# Patient Record
(demographics unavailable — no encounter records)

---

## 2024-11-15 NOTE — REVIEW OF SYSTEMS
[Fatigue] : fatigue [Vision Problems] : vision problems [Lower Ext Edema] : lower extremity edema [Shortness Of Breath] : shortness of breath [Cough] : cough [Dyspnea on Exertion] : dyspnea on exertion [Unsteady Walking] : ataxia [Negative] : Heme/Lymph [Fever] : no fever [Chills] : no chills [Hearing Loss] : no hearing loss [Chest Pain] : no chest pain [Palpitations] : no palpitations [Wheezing] : no wheezing [Abdominal Pain] : no abdominal pain [Nausea] : no nausea [Constipation] : no constipation [Dysuria] : no dysuria [Incontinence] : no incontinence [Joint Pain] : no joint pain [Joint Stiffness] : no joint stiffness [Muscle Pain] : no muscle pain [Back Pain] : no back pain [Skin Rash] : no skin rash [Headache] : no headache [Dizziness] : no dizziness [Insomnia] : no insomnia

## 2024-11-15 NOTE — CURRENT MEDS
[Takes medication as prescribed] : takes [None] : Patient does not have any barriers to medication adherence [Yes] : Reviewed medication list for presence of high-risk medications. [Blood Thinners] : blood thinners [FreeTextEntry1] : Apixaban - denies bleeding

## 2024-11-15 NOTE — COUNSELING
[Fall prevention counseling provided] : Fall prevention counseling provided [None] : None [Good understanding] : Patient has a good understanding of lifestyle changes and steps needed to achieve self management goal [Use recommended devices] : Use recommended devices

## 2024-11-15 NOTE — HISTORY OF PRESENT ILLNESS
[Home] : at home, [unfilled] , at the time of the visit. [Other Location: e.g. Home (Enter Location, City,State)___] : at [unfilled] [Verbal consent obtained from patient] : the patient, [unfilled] [Other: _____] : [unfilled] [de-identified] : This patient is Enrolled in the Post-Discharge [facility] Follow Up Program through [facility] for Continuity of Care S/P Recent Hospitalization until seen by PCP.  Brief Hospital Course copied: 67-year-old female former smoker, w pmh htn, hld, hfpef, afib, copd c/b chronic respiratory failure req 3 lpm via nc, bipolar disorder, anx/dep, osteoarthritis/osteoporosis, p/w sob/david; symptoms started yesterday, a/w worsening cough, denies recent exposure to sick contacts, has had recent travel a couple of weeks ago to [place] via car. symptoms have quickly progressed today, prompting patient to present to [facility] er for further evaluation.  Hospital Course: COPD Exacerbation (PCO2 64 unclear baseline s/p azithromycin x1, solumedrol 125 + Duoneb in ED, c/w Xopenex and Trelegy, s/p IV solumedrol 40mg Q12 hr and PO Azithro; on PO Prednisone. Patient to follow-up with PCP and pulmonologist. Atrial fibrillation with RVR, continue taking Eliquis. HFpEF, TTE with elevated PA pressures, c/w home Lasix.   Televisit with pt. RN Min also present. Pt is awake and alert, oriented x 3. Wearing nasal cannula with O2 @ 2l/m. Pt is able to speak in full sentences, no respiratory distress, not using accessory muscles. RN reporting no wheezing. Pt reports only intermittent cough, sleeping well at nights, no need for CPAP for DOYLE. Reports she needs surgery for ankle fracture, needs medical clearance once she improves. She has lost 4.6 lbs since being home. Emotional support provided to continue on her weight loss journey. She is ambulating with walker, makes contact with wall or furniture and sometimes a wheelchair.  Pt reports she does not use salt but has been taking a sodium tablet, informed her that is not in the discharge plan, to maintain the 2000mg sodium recommended on discharge and /u with her PCP/CARDS if they want to resume that medication. No fluid restrictions. Pt advised to also f/u for Daliresp and Potassium as those medications are not in her discharge plan. She has f/u appt with her CARDS on Monday 11/18. Pt will f/u with her psychiatrist for her mental health disorders. Confirm she is taking Xanax PRN, Depekane, Seroquel and Wellbutrin.   TCM NP reviewed that pt is under the [facility] program for home care until pt is seen by PCP. TCM NP will be assigned to sign Home Care orders post-discharge [FreeTextEntry1] : F/u post hospitalization at [facility] from 11/7-10 for SOB/VICTOR

## 2024-11-15 NOTE — PHYSICAL EXAM
[No Acute Distress] : no acute distress [Normal Sclera/Conjunctiva] : normal sclera/conjunctiva [Normal Outer Ear/Nose] : the outer ears and nose were normal in appearance [No JVD] : no jugular venous distention [No Respiratory Distress] : no respiratory distress  [No Accessory Muscle Use] : no accessory muscle use [No Edema] : there was no peripheral edema [Soft] : abdomen soft [Normal] : no rash [Speech Grossly Normal] : speech grossly normal [Memory Grossly Normal] : memory grossly normal [Normal Affect] : the affect was normal [Alert and Oriented x3] : oriented to person, place, and time [Normal Mood] : the mood was normal [Normal Insight/Judgement] : insight and judgment were intact [de-identified] : pt awake and alert x3, wearing nasal cannula [de-identified] : glasses [de-identified] : uses walker or make contact with wall and furniture

## 2024-11-15 NOTE — ASSESSMENT
[FreeTextEntry1] : COPD Exacerbation (PCO2 64 unclear baseline s/p azithromycin x1, solumedrol 125 + Duoneb in ED, c/w Xopenex and Trelegy, s/p IV solumedrol 40mg Q12 hr and PO Azithro; on PO Prednisone. Patient to follow-up with PCP and pulmonologist. Atrial fibrillation with RVR, continue taking Eliquis. HFpEF, TTE with elevated PA pressures, c/w home Lasix.

## 2024-11-15 NOTE — PLAN
[FreeTextEntry1] : 1. continue all medications as prescribed 2. f/u with CARDS/PCP on Monday /PULM 3. maintain 2,000mg Na diet 4. maintain fall precaution 5. cont O2 use as prescribed

## 2025-03-03 NOTE — PHYSICAL EXAM
[Respiratory Effort] : normal respiratory effort [Normal Rate and Rhythm] : normal rate and rhythm [Abdomen Tenderness] : ~T ~M No abdominal tenderness [No Rash or Lesion] : No rash or lesion [Alert] : alert [Oriented to Person] : oriented to person [Oriented to Place] : oriented to place [Oriented to Time] : oriented to time [Calm] : calm [de-identified] : no acute distress noted, [de-identified] : normocephalic [FreeTextEntry1] : palpable pedal pulses, varicose veins bilateral posterior calves

## 2025-03-03 NOTE — HISTORY OF PRESENT ILLNESS
[FreeTextEntry1] : 68-year-old female presents for new patient evaluation from her primary care provider regarding bilateral lower extremity varicosities on posterior calf.  She denies any leg swelling states that varicosities are not painful she does have orthopedic issues with her ankles she is minimally ambulatory as she is on home oxygen right now due to severe COPD denies DVT history has been trying to find compression that fits her

## 2025-03-03 NOTE — ASSESSMENT
[FreeTextEntry1] : 68-year-old female with asymptomatic bilateral lower extremity varicose veins and lymphedema  - patient measured for cicaid wraps and prescribed light compression 15-20mmhg due to difficulty with donning compression stockings  - return to vascular clinic as needed

## 2025-05-20 NOTE — DISCUSSION/SUMMARY
[FreeTextEntry1] : 68-year-old female with a history of obesity, bipolar disorder, cardiomyopathy, sinusitis seen today in follow-up.  COPD remains in a stage IV category.  No pulmonary contraindication to her proposed procedure but the patient should be maintained on her respiratory medications without change.

## 2025-05-20 NOTE — HISTORY OF PRESENT ILLNESS
[Continuous] : Continuous [NC] : Nasal Cannula [24 hrs] : 24 hours/day [Lung Cancer Screening] : Patient underwent lung cancer screening [2] : 2 [Stable] : stable [Difficulty Breathing During Exertion] : stable dyspnea on exertion [Feelings Of Weakness On Exertion] : stable exercise intolerance [Cough] : denies coughing [Coughing Up Sputum] : denies coughing up sputum [Wheezing] : denies wheezing [Regional Soft Tissue Swelling Both Lower Extremities] : denies lower extremity edema [___ Times a Day] : of [unfilled] time(s) a day [Cold] : cold weather [Pollen] : pollen exposure [Adherent] : the patient is adherent with ~his/her~ medication regimen [TextBox_4] : Preop right ankle repair [FreeTextEntry1] : 3 [TextBox_12] : 12/11/2016 [TextBox_27] : 10/2/20 [TextBox_42] : 10/27/21 [TextBox_52] : 3 [TextBox_57] : 9mm TITO [de-identified] : Obesity, bipolar disorder, HFrPF, sinusitis, lymphadenopathy [FreeTextEntry3] : Sputum is white without associated chest pain

## 2025-05-20 NOTE — PROCEDURE
[FreeTextEntry1] : 5/20/2025: Spirometry-no significant change from baseline.  Severe airways obstruction

## 2025-05-20 NOTE — END OF VISIT
[Time Spent: ___ minutes] : I have spent [unfilled] minutes of time on the encounter which excludes teaching and separately reported services. [FreeTextEntry3] : Hospitalization reviewed, PACS review

## 2025-05-20 NOTE — RESULTS/DATA
[TextEntry] : 9/22/2023:  Patient was ambulated on room air slowly in light of her orthopedic issues.  She desaturated to 88%.  On 3 L/min pulsed O2 patient saturation anish to 92%    PROCEDURE DATE: 11/06/2024 INTERPRETATION: EXAMINATION: XR CHEST URGENT CLINICAL INDICATION: Shortness of breath TECHNIQUE: Single frontal, portable view of the chest was obtained. COMPARISON: Chest x-ray 2/17/2024. FINDINGS: The heart is normal in size. Right basilar linear atelectasis. The lungs are otherwise clear. No pneumothorax or pleural effusion. No acute osseous abnormalities. IMPRESSION: Right basilar linear atelectasis. Otherwise, clear lungs. --- End of Report --- KAREEM WOOD MD; Resident Radiologist This document has been electronically signed. YOLANDE KANG MD; Attending Radiologist This document has been electronically signed. Nov 7 2024 9:03AM

## 2025-05-20 NOTE — CONSULT LETTER
[Dear  ___] : Dear  [unfilled], [Consult Letter:] : I had the pleasure of evaluating your patient, [unfilled]. [Please see my note below.] : Please see my note below. [Consult Closing:] : Thank you very much for allowing me to participate in the care of this patient.  If you have any questions, please do not hesitate to contact me. [Sincerely,] : Sincerely, [FreeTextEntry3] : Unruly Ventura MD FCCP\par  Pulmonary/Critical Care/Sleep Medicine\par  Department of Internal Medicine\par  \par  [facility] [employer]\par

## 2025-06-14 NOTE — PLAN
[FreeTextEntry1] : Plan:  Will increased furosemide to 40 mg orally twice daily for the next 3 days. Reassess each day to see how you are feeling. If improving can go back to once per day sooner. Caution with salt intake. Caution with water intake. Close follow up with your cardiologist. Any new or worsening will require an emergency department evaluation.

## 2025-06-14 NOTE — PHYSICAL EXAM
[No Acute Distress] : no acute distress [Well Nourished] : well nourished [Well Developed] : well developed [Normal Sclera/Conjunctiva] : normal sclera/conjunctiva [Normal Outer Ear/Nose] : the outer ears and nose were normal in appearance [Supple] : supple [No Respiratory Distress] : no respiratory distress  [No Accessory Muscle Use] : no accessory muscle use [Speech Grossly Normal] : speech grossly normal [Normal Affect] : the affect was normal [Alert and Oriented x3] : oriented to person, place, and time [Normal Insight/Judgement] : insight and judgment were intact [de-identified] : chronically ill but in no distress

## 2025-06-14 NOTE — HISTORY OF PRESENT ILLNESS
[Home] : at home, [unfilled] , at the time of the visit. [Other Location: e.g. Home (Enter Location, City,State)___] : at [unfilled] [Telehealth (audio & video)] : This visit was provided via telehealth using real-time 2-way audio visual technology. [Verbal consent obtained from patient] : the patient, [unfilled] [FreeTextEntry8] : 67 yo woman with PMHX of COPD O2 dependent on 3 liters, CHF, Afib, bipolar disorder, HTN, DOYLE, osteoarthritis, Pulmonary HTN presents with complaint of having had 5 pounds of weight gain over the past week and worsening edema in her legs.  She denies any worsening SOB.  She has no chest pain, nausea, vomiting or diarrhea.  She denies any light headedness, weakness, fever, chills.  She does have a mild cough productive of clear sputum.  Her oxygen requirements have not changes.  She admits to recently having friends in town and eating more and more unhealthy foods.  She has been taking her furosemide at 40 mg orally daily.  She was just discharged from inpatient for CHF exacerbation.

## 2025-06-14 NOTE — ASSESSMENT
[FreeTextEntry1] : 69 yo woman with PMHX of COPD O2 dependent on 3 liters, CHF, Afib, bipolar disorder, HTN, DOYLE, osteoarthritis, Pulmonary HTN presents with complaint of having had 5 pounds of weight gain over the past week and worsening edema in her legs.  She denies any worsening SOB.  She has no chest pain, nausea, vomiting or diarrhea.  She denies any light headedness, weakness, fever, chills.  She does have a mild cough productive of clear sputum.  Her oxygen requirements have not changes.  She admits to recently having friends in town and eating more and more unhealthy foods.  She has been taking her furosemide at 40 mg orally daily.  She was just discharged from inpatient for CHF exacerbation.    Assessment:  Fluid retention likely dietary related

## 2025-06-19 NOTE — DISCUSSION/SUMMARY
[FreeTextEntry1] : 68-year-old female with history of stage IV chronic obstructive lung disease complicated by heart failure preserved ejection fraction and now with atrial fibrillation on anticoagulation.  No evidence of pulmonary hypertension on last echocardiogram.  New pulmonary nodules most likely inflammatory in nature.  Will follow-up in 6 months.  No change in her bronchodilator regimen except for discontinuation of Roflumilast.

## 2025-06-19 NOTE — END OF VISIT
[FreeTextEntry3] : Hospitalization review, PACS review [Time Spent: ___ minutes] : I have spent [unfilled] minutes of time on the encounter which excludes teaching and separately reported services.

## 2025-06-19 NOTE — RESULTS/DATA
[TextEntry] : 9/22/2023:  Patient was ambulated on room air slowly in light of her orthopedic issues.  She desaturated to 88%.  On 3 L/min pulsed O2 patient saturation anish to 92%    ACC: [id number] EXAM: CT ANGIO CHEST PULM ART WAWIC ORDERED BY: MEGAN PETERS  PROCEDURE DATE: 06/05/2025    INTERPRETATION: CLINICAL INFORMATION: Shortness breath, off anticoagulation, rule out pulmonary embolism.  COMPARISON: CT chest 12/17/2024  CONTRAST/COMPLICATIONS: IV Contrast: Omnipaque 350 70 cc administered 30 cc discarded Oral Contrast: NONE.  PROCEDURE: CT Angiography of the Chest. Sagittal and coronal reformats were performed as well as 3D (MIP) reconstructions.  FINDINGS:  LUNGS AND LARGE AIRWAYS: Patent central airways. Emphysema. Bilateral lower lobe dependent atelectasis. New 0.8 cm right apical nodule (5-33) and 0.8 cm right upper lobe perifissural nodule (5:72). Stable 4 mm nodule in the left lower lobe (5:95). New 4 mm left upper lobe nodule (5:102). PLEURA: No pleural effusion. VESSELS: No pulmonary embolism. Aortic calcifications. HEART: Cardiomegaly. No pericardial effusion. MEDIASTINUM AND MARILYN: Prominent mediastinal lymph nodes, for example a right paratracheal node measuring 2.0 x 1.2 cm. CHEST WALL AND LOWER NECK: Within normal limits. VISUALIZED UPPER ABDOMEN: Cholelithiasis. BONES: Degenerative changes.  IMPRESSION: * No pulmonary embolism. * Bilateral pulmonary nodules measuring up to 0.8 mm, some of which are new. Recommend follow-up CT in 6 months for further evaluation.  --- End of Report ---     TRESA TYLER MD; Resident Radiologist This document has been electronically signed. ARLET NASH DO; Attending Radiologist This document has been electronically signed. Jun 5 2025 4:35PM

## 2025-06-19 NOTE — HISTORY OF PRESENT ILLNESS
[TextBox_4] : Hospital Course: Discharge Date 11-Jun-2025 Admission Date 05-Jun-2025 10:17 Reason for Admission COPD Hospital Course Patient is a 68 year old female, a former smoker with a complex past medical history including hypertension, hyperlipidemia, HFpEF, atrial fibrillation currently on Eliquis,  COPD complicated by chronic respiratory failure requiring home oxygen at 3 LPM via nasal cannula, bipolar disorder, anxiety/depression, and osteoarthritis/osteoporosis, presenting to the ED with a three day history of shortness of breath. Pt admitted for acute on chronic hypoxemic respiratory failure secondary to COPD exacerbation and HFpeF exacerbation. CXR with new infiltrates. CTA chest with nodules but no PE/ infiltrates. RVP negative. Placed on solumedrol. Supplemental oxygen supplied and taper down to baseline 3L as tolerated. Troponin negative. TTE with preserved EF. Continued on lasix/ BB / valsartan. Could not tolerated Entresto. Venous doppler negative for DVT. Cardiology performed RHC on 6/9 which showed normal pressures. Post-procedure pt went to afib with RVR. EP consulted and offered ablation, but pt refused. Patient is medically stable for discharge.       Med Reconciliation: Medication Reconciliation Status Admission Reconciliation is Completed Discharge Reconciliation is Completed  Discharge Medications albuterol 2.5 mg/3 mL (0.083%) inhalation solution: 3 milliliter(s) by nebulizer every 4 hours as needed for shortness of breath and/or wheezing ALPRAZolam 0.5 mg oral tablet: 1 tab(s) orally 3 times a day as needed for anxiety apixaban 5 mg oral tablet: 1 tab(s) orally every 12 hours Hold restart on 6/5 PM Depakene 250 mg oral capsule: 1 cap(s) orally once a day Flonase 50 mcg/inh nasal spray: 1 spray(s) in each nostril 2 times a day Lasix 40 mg oral tablet: 1 tab(s) orally once a day potassium chloride 10 mEq oral capsule, extended release: 1 cap(s) orally 2 times a day pravastatin 20 mg oral tablet: 1 tab(s) orally once a day (at bedtime) predniSONE 10 mg oral tablet: 1 tab(s) orally once a day predniSONE 10 mg oral tablet: 3 tab(s) orally once a day predniSONE 20 mg oral tablet: 1 tab(s) orally once a day Roflumilast 250 mcg oral tablet: 1 tab(s) orally once a day SEROquel 200 mg oral tablet: 1 tab(s) orally once a day (at bedtime) Toprol- mg oral tablet, extended release: 1 tab(s) orally once a day Trelegy Ellipta 200 mcg-62.5 mcg-25 mcg/inh inhalation powder: 1 puff(s) inhaled once a day valsartan 40 mg oral tablet: 1 tab(s) orally once a day Wellbutrin  mg/24 hours oral tablet, extended release: 1 tab(s) orally every 24 hours  , , Care Plan/Procedures: Discharge Diagnoses, Assessment and Plan of Treatment PRINCIPAL DISCHARGE DIAGNOSIS Diagnosis: COPD exacerbation Assessment and Plan of Treatment: Please take medications as prescribed and follow up with your Pulmonologist.  Patient has seen 8 days status post discharge.  She has completed her course of steroids and antibiotics.  She feels back to baseline without complaints of leg edema, paroxysmal nocturnal dyspnea orthopnea or wheezing.